# Patient Record
Sex: FEMALE | Race: WHITE | NOT HISPANIC OR LATINO | Employment: OTHER | URBAN - NONMETROPOLITAN AREA
[De-identification: names, ages, dates, MRNs, and addresses within clinical notes are randomized per-mention and may not be internally consistent; named-entity substitution may affect disease eponyms.]

---

## 2019-09-23 ENCOUNTER — OFFICE VISIT CONVERTED (OUTPATIENT)
Dept: FAMILY MEDICINE CLINIC | Age: 74
End: 2019-09-23
Attending: NURSE PRACTITIONER

## 2020-06-02 ENCOUNTER — OFFICE VISIT CONVERTED (OUTPATIENT)
Dept: FAMILY MEDICINE CLINIC | Age: 75
End: 2020-06-02
Attending: NURSE PRACTITIONER

## 2020-06-10 ENCOUNTER — OFFICE VISIT CONVERTED (OUTPATIENT)
Dept: FAMILY MEDICINE CLINIC | Age: 75
End: 2020-06-10
Attending: NURSE PRACTITIONER

## 2020-06-10 ENCOUNTER — HOSPITAL ENCOUNTER (OUTPATIENT)
Dept: OTHER | Facility: HOSPITAL | Age: 75
Discharge: HOME | End: 2020-06-10
Attending: NURSE PRACTITIONER

## 2020-06-10 LAB
ALBUMIN SERPL-MCNC: 4.2 G/DL (ref 3.5–5)
ALBUMIN/GLOB SERPL: 1.7 {RATIO} (ref 1.4–2.6)
ALP SERPL-CCNC: 63 U/L (ref 43–160)
ALT SERPL-CCNC: 8 U/L (ref 10–40)
ANION GAP SERPL CALC-SCNC: 13 MMOL/L (ref 8–19)
AST SERPL-CCNC: 16 U/L (ref 15–50)
BILIRUB SERPL-MCNC: 0.82 MG/DL (ref 0.2–1.3)
BUN SERPL-MCNC: 15 MG/DL (ref 5–25)
BUN/CREAT SERPL: 16 {RATIO} (ref 6–20)
CALCIUM SERPL-MCNC: 9.5 MG/DL (ref 8.7–10.4)
CHLORIDE SERPL-SCNC: 104 MMOL/L (ref 99–111)
CHOLEST SERPL-MCNC: 192 MG/DL (ref 107–200)
CHOLEST/HDLC SERPL: 2 {RATIO} (ref 3–6)
CONV CO2: 26 MMOL/L (ref 22–32)
CONV TOTAL PROTEIN: 6.7 G/DL (ref 6.3–8.2)
CREAT UR-MCNC: 0.91 MG/DL (ref 0.5–0.9)
GFR SERPLBLD BASED ON 1.73 SQ M-ARVRAT: >60 ML/MIN/{1.73_M2}
GLOBULIN UR ELPH-MCNC: 2.5 G/DL (ref 2–3.5)
GLUCOSE SERPL-MCNC: 98 MG/DL (ref 65–99)
HDLC SERPL-MCNC: 97 MG/DL (ref 40–60)
LDLC SERPL CALC-MCNC: 84 MG/DL (ref 70–100)
OSMOLALITY SERPL CALC.SUM OF ELEC: 289 MOSM/KG (ref 273–304)
POTASSIUM SERPL-SCNC: 4.1 MMOL/L (ref 3.5–5.3)
SODIUM SERPL-SCNC: 139 MMOL/L (ref 135–147)
TRIGL SERPL-MCNC: 56 MG/DL (ref 40–150)
TSH SERPL-ACNC: 2.54 M[IU]/L (ref 0.27–4.2)
VLDLC SERPL-MCNC: 11 MG/DL (ref 5–37)

## 2020-08-04 ENCOUNTER — HOSPITAL ENCOUNTER (OUTPATIENT)
Dept: OTHER | Facility: HOSPITAL | Age: 75
Discharge: HOME | End: 2020-08-04
Attending: NURSE PRACTITIONER

## 2021-03-16 ENCOUNTER — HOSPITAL ENCOUNTER (OUTPATIENT)
Dept: VACCINE CLINIC | Facility: HOSPITAL | Age: 76
Discharge: HOME | End: 2021-03-16
Attending: INTERNAL MEDICINE

## 2021-04-06 ENCOUNTER — HOSPITAL ENCOUNTER (OUTPATIENT)
Dept: VACCINE CLINIC | Facility: HOSPITAL | Age: 76
Discharge: HOME | End: 2021-04-06
Attending: INTERNAL MEDICINE

## 2021-05-18 NOTE — PROGRESS NOTES
Mau Lo 1945     Office/Outpatient Visit    Visit Date: Mon, Sep 23, 2019 01:39 pm    Provider: Ko Alcantar N.P. (Assistant: Jessica Salinas MA)    Location: [facility]        Electronically signed by Ko Alcantar N.P. on  09/23/2019 07:54:07 PM                             SUBJECTIVE:        CC:     Ms. Lo is a 74 year old White female.  Patient here today for L knee injection;         HPI:         Patient complains of knee pain.  The affected area is the left knee.  for knee pain.  Pain began 6 months ago.  The pattern of joint symptoms has been progressive worsening.  Pain is decreased with ice.  She denies associated symptoms including swelling and warmth.  Pertinent medical history is remarkable for osteoarthritis.      ROS:     CONSTITUTIONAL:  Negative for chills, fatigue, fever, and weight change.      CARDIOVASCULAR:  Negative for chest pain, orthopnea, paroxysmal nocturnal dyspnea and pedal edema.      RESPIRATORY:  Negative for dyspnea.      GASTROINTESTINAL:  Negative for abdominal pain, constipation, diarrhea, nausea and vomiting.      MUSCULOSKELETAL:  Positive for Hx Bilateral knee pain, gotten worse over last 6mo.      PSYCHIATRIC:  Negative for anxiety, depression, and sleep disturbances.          PMH/FMH/SH:     Last Reviewed on 9/23/2019 02:05 PM by Ko Alcantar    Past Medical History: moved back from [place] june 2017 former pt dr royal lozada [phone]     UNREMARKABLE         PREVENTIVE HEALTH MAINTENANCE             BONE DENSITY: was last done june 2017 with the following abnormality noted-- borderline osteopenia see old records     COLORECTAL CANCER SCREENING: Up to date (colonoscopy q10y; sigmoidoscopy q5y; Cologuard q3y) was last done 2007; colonoscopy with normal results     EYE EXAM: was last done jan 2016     MAMMOGRAM: Done within last 2 years and results in are chart was last done june 2017 with normal results     PAP  SMEAR: was last done 2016 with normal results         Surgical History:         Hysterectomy    Tonsillectomy/Adenoidectomy    right shoulder surgery 2016  second surgery;      Laminectomy         Family History:     Father: ;  Cerebrovascular Accident     Mother: stroke  2016;  Osteoporosis         Social History:     Occupation:    Retired     Marital Status:      Children: 3 children         Tobacco/Alcohol/Supplements:     Last Reviewed on 2019 02:05 PM by Ko Alcantar    Tobacco:  Nonsmoker (never smoked);         Alcohol:    Drinks alcohol very infrequently.          Substance Abuse History:     Last Reviewed on 2019 02:05 PM by Ko Alcantar        Mental Health History:     Last Reviewed on 2019 02:05 PM by Ko Alcantar        Communicable Diseases (eg STDs):     Last Reviewed on 2019 02:05 PM by Ko Alcantar            Current Problems:     Last Reviewed on 2019 02:05 PM by Ko Aclantar    Mixed hyperlipidemia     Low back pain     Knee pain         Immunizations:     Prevnar 13 (Pneumococcal PCV 13) 10/12/2016     Flulaval 10/19/2010     Fluzone (3 + years dose) 2009     Influenza A (H1N1), IM (3+ years) Monovalent 2009     Influenza High-Dose Virus Vaccine pf (>=65 yr) 10/11/2017     Influenza High-Dose Virus Vaccine pf (>=65 yr) 10/5/2016     PNEUMOVAX 23 (Pneumococcal PPV23) 2017     Tdap (Tetanus, reduced diph, acellular pertussis) 2011         Allergies:     Last Reviewed on 2019 02:05 PM by Ko Alcantar    Pneumovax 23: redness and swelling at injection site (Adverse Reaction)    Penicillins:    Macrodantin:    Ultram:        Current Medications:     Last Reviewed on 2019 02:05 PM by Ko Alcantar    Estradiol 0.075mg Transdermal Patch Apply 1 patch(es) to lower abdomen 2 times weekly     Multivitamin/Mineral Supplement         OBJECTIVE:        Vitals:         Current: 2019  1:45:48 PM    Ht:  5 ft, 3.75 in;  Wt: 143.2 lbs;  BMI: 24.8    T: 97.8 F (oral);  BP: 132/64 mm Hg (left arm, sitting);  P: 84 bpm (left arm (BP Cuff), sitting);  sCr: 0.8 mg/dL;  GFR: 62.07        Exams:     PHYSICAL EXAM:     GENERAL: vital signs recorded - well developed, well nourished;  no apparent distress;     RESPIRATORY: normal respiratory rate and pattern with no distress; normal breath sounds with no rales, rhonchi, wheezes or rubs;     CARDIOVASCULAR: normal rate; rhythm is regular;  no systolic murmur; no edema;     GASTROINTESTINAL: nontender; normal bowel sounds; no organomegaly;     MUSCULOSKELETAL: Left knee generalized tenderness with FROM     NEUROLOGIC: GROSSLY INTACT     PSYCHIATRIC:  appropriate affect and demeanor; normal speech pattern; grossly normal memory;         Procedures:     Knee pain     Procedure Note:  Arthrocentesis/Injection     Arthrocentesis of left knee joint is performed.  Written informed consent was obtained.  The site is prepped with alcohol and betadine.  The site is anesthetized with 1 cc of 1% lidocaine.  The joint is injected with Kenalog 40 mg.  The needle is carefully introduced into the joint space. The patient experienced minimal discomfort during the procedure.  No complications.              ASSESSMENT:           719.46   M70.42  Knee pain              DDx:         ORDERS:         Procedures Ordered:       20610  Arthrocentesis, major joint  (In-House)           Other Orders:         Kenalog, per 10 mg (x4)                 PLAN:          Knee pain           Orders:       [id number]  Arthrocentesis, major joint  (In-House)                     Kenalog, per 10 mg (x4)             CHARGE CAPTURE:           Primary Diagnosis:     719.46 Knee pain            M70.42    Prepatellar bursitis, left knee              Orders:          [id number]   Arthrocentesis, major joint  (In-House)                                           Kenalog, per 10 mg (x4)

## 2021-05-18 NOTE — PROGRESS NOTES
Mau Lo  1945     Office/Outpatient Visit    Visit Date: Tue, Jun 2, 2020 09:57 am    Provider: Ko Alcantar N.P. (Assistant: Mee Jimenez, )    Location: [facility]        Electronically signed by Ko Alcantar N.P. on  06/07/2020 09:00:39 PM                             Subjective:        CC: Ms. Lo is a 74 year old White female.  This is a follow-up visit.  knee injection;         HPI:       Here with right knee pain , gradually getting worse . Denies any recent fall or injury;    ROS:     CONSTITUTIONAL:  Negative for chills, fatigue, fever, and weight change.      CARDIOVASCULAR:  Negative for chest pain, orthopnea, paroxysmal nocturnal dyspnea and pedal edema.      RESPIRATORY:  Negative for dyspnea.      MUSCULOSKELETAL:  Positive for Right knee pain , gradually getting worse.      NEUROLOGICAL:  Negative for dizziness, headaches, paresthesias, and weakness.      PSYCHIATRIC:  Negative for anxiety, depression, and sleep disturbances.          Past Medical History / Family History / Social History:         Last Reviewed on 6/02/2020 10:34 AM by Ko Alcantar    Past Medical History: moved back from [place] june 2017 former pt dr royal lozada [phone]     UNREMARKABLE         PREVENTIVE HEALTH MAINTENANCE             BONE DENSITY: was last done june 2017 with the following abnormality noted-- borderline osteopenia see old records     COLORECTAL CANCER SCREENING: Up to date (colonoscopy q10y; sigmoidoscopy q5y; Cologuard q3y) was last done 2015; colonoscopy with normal results; Cologaurd neg 2015; Cologuard normal     EYE EXAM: was last done jan 2016     MAMMOGRAM: Done within last 2 years and results in are chart was last done 6/2019 with normal results     PAP SMEAR: was last done 6/2019 with normal results         Surgical History:         Hysterectomy    Tonsillectomy/Adenoidectomy    right shoulder surgery 2016  second surgery;      Laminectomy         Family History:     Father: ;  Cerebrovascular Accident     Mother: stroke  ;  Osteoporosis         Social History:     Occupation:    Retired     Marital Status:      Children: 3 children         Tobacco/Alcohol/Supplements:     Last Reviewed on 2020 10:34 AM by Ko Alcantar    Tobacco:  Nonsmoker (never smoked);         Alcohol:    Drinks alcohol very infrequently.          Current Problems:     Last Reviewed on 2020 10:34 AM by Ko Alcantar    Prepatellar bursitis, left knee    Encounter for screening for malignant neoplasm of colon        Immunizations:     Prevnar 13 (Pneumococcal PCV 13) 10/12/2016    Flulaval 10/19/2010    Fluzone (3 + years dose) 2009    Influenza A (H1N1), IM (3+ years) Monovalent 2009    Fluzone High-Dose pf (>=65 yr) 2019    Influenza High-Dose Virus Vaccine pf (>=65 yr) 10/11/2017    Influenza High-Dose Virus Vaccine pf (>=65 yr) 10/5/2016    PNEUMOVAX 23 (Pneumococcal PPV23) 2017    Tdap (Tetanus, reduced diph, acellular pertussis) 2011        Allergies:     Last Reviewed on 2020 10:34 AM by Ko Alcantar    Pneumovax 23: redness and swelling at injection site  (Adverse Reaction)    Penicillins:      Macrodantin:      Ultram:          Current Medications:     Last Reviewed on 2020 10:34 AM by Ko Alcantar    Multivitamin/Mineral Supplement     Estradiol 0.075 mg/24 hr Transdermal Patch, Transdermal Weekly [Apply 1 patch(es) to lower abdomen 2 times weekly]    Vitamin D3     B12     biotin         Objective:        Vitals:         Current: 2020 10:03:52 AM    Ht:  5 ft, 3.75 in;  Wt: 141.8 lbs;  BMI: 24.5T: 98.1 F (oral);  BP: 112/64 mm Hg (right arm, sitting);  P: 73 bpm (right arm (BP Cuff), sitting);  sCr: 0.8 mg/dL;  GFR: 61.81        Exams:     PHYSICAL EXAM:     GENERAL: vital signs recorded - well developed, well nourished;  no apparent distress;     RESPIRATORY:  normal respiratory rate and pattern with no distress; normal breath sounds with no rales, rhonchi, wheezes or rubs;     CARDIOVASCULAR: normal rate; rhythm is regular;  no systolic murmur; no edema;     MUSCULOSKELETAL: Generalized right knee tenderness with FROM ,no edema , neg drawer test     NEUROLOGIC: GROSSLY INTACT     PSYCHIATRIC:  appropriate affect and demeanor; normal speech pattern; grossly normal memory;         Procedures:     Prepatellar bursitis, right knee    Procedure Note:  Arthrocentesis/Injection     Written informed consent was obtained.  The site is prepped with alcohol and betadine.  The site is anesthetized with 1 cc of 1% lidocaine.  The joint is injected with Kenalog 80mg mg.  The needle is carefully introduced into the joint space. The patient experienced no discomfort during the procedure.  No complications.              Assessment:         Z12.11   Encounter for screening for malignant neoplasm of colon       M70.41   Prepatellar bursitis, right knee           ORDERS:         Lab Orders:       APPTO  Appointment need  (In-House)              Procedures Ordered:       20610  Arthrocentesis aspir&/inj major jt/bursa w/o us  (In-House)              Other Orders:       [id number]  Cologuard  (Send-Out)            *  Kenalog 10 mg  (In-House)  (x8)                    Plan:         Encounter for screening for malignant neoplasm of colon        FOLLOW-UP: Schedule a follow-up appointment in 1 week.:.:for Medicare Wellness Visit           Orders:       [id number]  Cologuard  (Send-Out)            APPTO  Appointment need  (In-House)              Prepatellar bursitis, right kneeIce on and off 20 min cycles for tonight . dmt          Orders:       20610  Arthrocentesis aspir&/inj major jt/bursa w/o us  (In-House)            *  Kenalog 10 mg  (In-House)  (x8)                Patient Recommendations:        For  Encounter for screening for malignant neoplasm of colon:    Schedule a follow-up visit in  1 week.                APPOINTMENT INFORMATION:        Monday Tuesday Wednesday Thursday Friday Saturday Sunday            Time:___________________AM  PM   Date:_____________________             Charge Capture:         Primary Diagnosis:     Z12.11  Encounter for screening for malignant neoplasm of colon           Orders:      APPTO  Appointment need  (In-House)              M70.41  Prepatellar bursitis, right knee           Orders:      20610  Arthrocentesis aspir&/inj major jt/bursa w/o us  (In-House)            *  Kenalog 10 mg  (In-House)  (x8)

## 2021-05-18 NOTE — PROGRESS NOTES
Mau Lo  1945     Office/Outpatient Visit    Visit Date: Wed, Arnold 10, 2020 10:22 am    Provider: Ko Alcantar N.P. (Assistant: Janeth Reinoso RN)    Location: [facility]        Electronically signed by Ko Alcantar N.P. on  06/10/2020 01:15:12 PM                             Subjective:        CC: Ms. Lo is a 74 year old White female.  MCW Exam;         HPI:           Ms. Lo is here for a Medicare wellness visit.  The required HRA questions are integrated within this visit note. Family medical history and individual medical/surgical history were reviewed and updated.  A current height, weight, BMI, blood pressure, and pulse were recorded in the vitals section of the note and have been reviewed. Patient's medications, including supplements, were recorded in the chart and reviewed.  Current providers and suppliers were reviewed and updated.          Self-Assessment of Health: She rates her health as very good. She rates her confidence of being able to control/manage most of her health problems as very confident. Her physical/emotional health has limited her social activites not at all.  A review of possible cognitive impairment was performed and the following was noted: she is having difficulty driving;  not experiencing changes in memory A review of functional ability, including bathing, dressing, walking, and urine/bowel continence as well as level of safety was performed and was found to be negative.  Falls Risk: Has not had any falls or only one fall without injury in the past year.  She denies having trouble hearing the TV/radio when others do not, having to strain to hear or understand conversations and wearing hearing aid(s).  Concerning home safety, she reports that at home she DOES have adequate lighting, a skid resistant shower/tub, functioning smoke alarms and absence of throw rugs, but not grab bars in the bath or handrails on stairs.          Immunization  Status: ** >10 years since last Td booster; Age>60, no shingles vaccination; Physical Activity: She exercises for at least 20 minutes 3 or more days/week.; Type of diet patient normally eats is described as well-balanced with fruits and vegetables Tobacco: She has never smoked.  Preventative Health updated today           PHQ-9 Depression Screening: Completed form scanned and in chart; Total Score 0     ROS:     CONSTITUTIONAL:  Negative for fatigue and fever.      EYES:  Negative for blurred vision and eye pain.      E/N/T:  Negative for diminished hearing and nasal congestion.      CARDIOVASCULAR:  Negative for chest pain, palpitations, tachycardia, orthopnea, and edema.      RESPIRATORY:  Negative for recent cough, dyspnea and frequent wheezing.      GASTROINTESTINAL:  Negative for abdominal pain, constipation, diarrhea, nausea and vomiting.      GENITOURINARY:  Negative for dysuria and hematuria.      MUSCULOSKELETAL:  Negative for arthralgias and myalgias.      INTEGUMENTARY/BREAST:  Negative for atypical mole(s) and rash.      NEUROLOGICAL:  Negative for dizziness, memory loss, paresthesias, tremor and weakness.      PSYCHIATRIC:  Negative for anxiety, depression, and sleep disturbances.          Past Medical History / Family History / Social History:         Last Reviewed on 6/10/2020 10:46 AM by Ko Alcantar    Past Medical History: moved back from [place] june 2017 former pt dr royal lozada [phone]     UNREMARKABLE         PREVENTIVE HEALTH MAINTENANCE             BONE DENSITY: was last done june 2017 with the following abnormality noted-- borderline osteopenia see old records     COLORECTAL CANCER SCREENING: Up to date (colonoscopy q10y; sigmoidoscopy q5y; Cologuard q3y) was last done 2015-cologuard; colonoscopy with normal results; Cologaurd neg 2015; Cologuard normal     EYE EXAM: was last done jan 2016     MAMMOGRAM: Done within last 2 years and results in are chart was last done  2019 with normal results     PAP SMEAR: was last done 2019 with normal results         PAST MEDICAL HISTORY             CURRENT MEDICAL PROVIDERS:    Ophthalmologist: Dr Larsen         Surgical History:         Hysterectomy    Tonsillectomy/Adenoidectomy    right shoulder surgery 2016  second surgery;     Laminectomy         Family History:     Father: ;  Cerebrovascular Accident     Mother: stroke  2016;  Osteoporosis         Social History:     Occupation:    Retired     Marital Status:      Children: 3 children         Tobacco/Alcohol/Supplements:     Last Reviewed on 6/10/2020 10:46 AM by Ko Alcantar    Tobacco:  Nonsmoker (never smoked);         Alcohol:    Drinks alcohol very infrequently.          Mental Health History:     Last Reviewed on 6/10/2020 10:46 AM by Ko Alcantar        Current Problems:     Last Reviewed on 6/10/2020 10:46 AM by Ko Alcantar    Prepatellar bursitis, right knee    Encounter for screening for malignant neoplasm of colon    Encounter for screening for depression    Encounter for general adult medical examination without abnormal findings        Immunizations:     Prevnar 13 (Pneumococcal PCV 13) 10/12/2016    Flulaval 10/19/2010    Fluzone (3 + years dose) 2009    Influenza A (H1N1), IM (3+ years) Monovalent 2009    Fluzone High-Dose pf (>=65 yr) 2019    Influenza High-Dose Virus Vaccine pf (>=65 yr) 10/11/2017    Influenza High-Dose Virus Vaccine pf (>=65 yr) 10/5/2016    PNEUMOVAX 23 (Pneumococcal PPV23) 2017    Tdap (Tetanus, reduced diph, acellular pertussis) 2011        Allergies:     Last Reviewed on 6/10/2020 10:46 AM by Ko Alcantar    Pneumovax 23: redness and swelling at injection site  (Adverse Reaction)    Penicillins:      Macrodantin:      Ultram:          Current Medications:     Last Reviewed on 6/10/2020 10:46 AM by Ko Alcantar    Vitamin D3     B12     biotin      Multivitamin/Mineral Supplement     Estradiol 0.075 mg/24 hr Transdermal Patch, Transdermal Weekly [Apply 1 patch(es) to lower abdomen 2 times weekly]        Objective:        Vitals:         Current: 6/10/2020 10:35:13 AM    Ht:  5 ft, 3.75 in;  Wt: 140.2 lbs;  BMI: 24.3T: 96.7 F (temporal);  BP: 129/69 mm Hg (right arm, sitting);  P: 65 bpm (right arm (BP Cuff), sitting);  sCr: 0.8 mg/dL;  GFR: 61.51VA: 20/50 OD, 20/50 OS (near, without correction)        Exams:     PHYSICAL EXAM:     GENERAL: vital signs recorded - well developed, well nourished;  well groomed;  no apparent distress;     NECK:  supple, full ROM; no thyromegaly; no carotid bruits;     RESPIRATORY: normal respiratory rate and pattern with no distress; normal breath sounds with no rales, rhonchi, wheezes or rubs;     CARDIOVASCULAR: normal rate; rhythm is regular;  no systolic murmur; no edema;     GASTROINTESTINAL: nontender, nondistended; no hepatosplenomegaly or masses; no bruits;     MUSCULOSKELETAL:  Normal range of motion, strength and tone;     NEUROLOGIC: GROSSLY INTACT     PSYCHIATRIC:  appropriate affect and demeanor; normal speech pattern; grossly normal memory;         Assessment:         Z00.00   Encounter for general adult medical examination without abnormal findings       Z13.31   Encounter for screening for depression       Z12.11   Encounter for screening for malignant neoplasm of colon       Z12.31   Encounter for screening mammogram for malignant neoplasm of breast       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 Metabolic Panel  (Send-Out)            87567  LPDP - [facility] Lipid Panel  (Send-Out)            22960  TSH - [facility] TSH  (Send-Out)              Procedures Ordered:         Annual wellness visit, includes a PPPS, subsequent visit  (In-House)              Other Orders:       [id number]  Cologuard  (Send-Out)            1101F  Pt screen for fall risk; document no falls in past year or only 1 fall w/o injury in past year (JOHNNY)  (In-House)              Screening mammogram results documented  (Send-Out)              Depression screen negative  (In-House)                      Plan:         Encounter for general adult medical examination without abnormal findings    MIPS Has had no falls or only one fall without injury in the past year Vaccines Flu and Pneumonia updated in Shot record Screening mammomgram done within last 2 years and results in are chart Colorectal Cancer Screening is up to date and the results are in the chart ADVANCE DIRECTIVES (Please update in PMH): Living will at [facility]           Orders:       1101F  Pt screen for fall risk; document no falls in past year or only 1 fall w/o injury in past year (JOHNNY)  (In-House)              Screening mammogram results documented  (Send-Out)            3017F  Colorectal CA screen results documented and reviewed (PV)  (In-House)              Annual wellness visit, includes a PPPS, subsequent visit  (In-House)              Encounter for screening for depression    MIPS PHQ-9 Depression Screening: Completed form scanned and in chart; Total Score 0; Negative Depression Screen           Orders:         Depression screen negative  (In-House)              Encounter for screening for malignant neoplasm of colon          Orders:       92208  Cologuard  (Send-Out)              Encounter for screening mammogram for malignant neoplasm of breast        FOLLOW-UP TESTING #1:    RADIOLOGY:  I have ordered Screening 3D Mammogram  Bilateral to be done today.            Orders:       01976  Screening digital breast tomosynthesis bi  (Send-Out)              Other disorders of continuity of bone, unspecified site        FOLLOW-UP TESTING #1:    RADIOLOGY:  I have ordered Dexa Scan to be done today.            Orders:       62164  DXA, bone density study, 1 or more sites; axial skeleton (eg hips, pelvis, spine)  (Send-Out)              Encounter for screening for cardiovascular disorders    LABORATORY:  Labs ordered to be performed today include Comprehensive metabolic panel, lipid panel, and TSH.            Orders:       39991  COMP - [facility] Comp. Metabolic Panel  (Send-Out)            79528  LPDP - [facility] Lipid Panel  (Send-Out)            71802  TSH - [facility] TSH  (Send-Out)                  Patient Recommendations:        For  Encounter for general adult medical examination without abnormal findings:    I also recommend at [facility].              Charge Capture:         Primary Diagnosis:     Z00.00  Encounter for general adult medical examination without abnormal findings           Orders:      56232  Preventive medicine, established patient, age 65+ years  (In-House)            1101F  Pt screen for fall risk; document no falls in past year or only 1 fall w/o injury in past year (JOHNNY)  (In-House)            3017F  Colorectal CA screen results documented and reviewed (PV)  (In-House)              Annual wellness visit, includes a PPPS, subsequent visit  (In-House)              Z13.31  Encounter for screening for depression           Orders:        Depression screen negative  (In-House)              Z12.11  Encounter for screening for malignant neoplasm of colon     Z12.31  Encounter for screening mammogram for malignant neoplasm of breast     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

## 2021-07-01 VITALS
TEMPERATURE: 97.8 F | SYSTOLIC BLOOD PRESSURE: 132 MMHG | BODY MASS INDEX: 24.45 KG/M2 | HEART RATE: 84 BPM | DIASTOLIC BLOOD PRESSURE: 64 MMHG | HEIGHT: 64 IN | WEIGHT: 143.2 LBS

## 2021-07-02 VITALS
SYSTOLIC BLOOD PRESSURE: 129 MMHG | TEMPERATURE: 96.7 F | DIASTOLIC BLOOD PRESSURE: 69 MMHG | WEIGHT: 140.2 LBS | BODY MASS INDEX: 23.93 KG/M2 | HEART RATE: 65 BPM | HEIGHT: 64 IN

## 2021-07-02 VITALS
TEMPERATURE: 98.1 F | HEART RATE: 73 BPM | HEIGHT: 64 IN | SYSTOLIC BLOOD PRESSURE: 112 MMHG | BODY MASS INDEX: 24.21 KG/M2 | DIASTOLIC BLOOD PRESSURE: 64 MMHG | WEIGHT: 141.8 LBS

## 2021-07-06 ENCOUNTER — TRANSCRIBE ORDERS (OUTPATIENT)
Dept: ADMINISTRATIVE | Facility: HOSPITAL | Age: 76
End: 2021-07-06

## 2021-07-06 DIAGNOSIS — Z12.31 VISIT FOR SCREENING MAMMOGRAM: Primary | ICD-10-CM

## 2021-07-13 ENCOUNTER — TELEPHONE (OUTPATIENT)
Dept: FAMILY MEDICINE CLINIC | Age: 76
End: 2021-07-13

## 2021-07-13 ENCOUNTER — LAB (OUTPATIENT)
Dept: LAB | Facility: HOSPITAL | Age: 76
End: 2021-07-13

## 2021-07-13 DIAGNOSIS — R30.0 DYSURIA: ICD-10-CM

## 2021-07-13 DIAGNOSIS — R30.0 DYSURIA: Primary | ICD-10-CM

## 2021-07-13 LAB
BACTERIA UR QL AUTO: ABNORMAL /HPF
BILIRUB UR QL STRIP: NEGATIVE
CLARITY UR: ABNORMAL
COLOR UR: YELLOW
GLUCOSE UR STRIP-MCNC: NEGATIVE MG/DL
HGB UR QL STRIP.AUTO: ABNORMAL
KETONES UR QL STRIP: NEGATIVE
LEUKOCYTE ESTERASE UR QL STRIP.AUTO: NEGATIVE
NITRITE UR QL STRIP: POSITIVE
PH UR STRIP.AUTO: 6.5 [PH] (ref 5–8)
PROT UR QL STRIP: NEGATIVE
RBC # UR: ABNORMAL /HPF
REF LAB TEST METHOD: ABNORMAL
SP GR UR STRIP: 1.01 (ref 1–1.03)
SQUAMOUS #/AREA URNS HPF: ABNORMAL /HPF
UROBILINOGEN UR QL STRIP: ABNORMAL
WBC UR QL AUTO: ABNORMAL /HPF

## 2021-07-13 PROCEDURE — 87086 URINE CULTURE/COLONY COUNT: CPT

## 2021-07-13 PROCEDURE — 87077 CULTURE AEROBIC IDENTIFY: CPT

## 2021-07-13 PROCEDURE — 81001 URINALYSIS AUTO W/SCOPE: CPT

## 2021-07-13 PROCEDURE — 87186 SC STD MICRODIL/AGAR DIL: CPT

## 2021-07-13 RX ORDER — SULFAMETHOXAZOLE AND TRIMETHOPRIM 800; 160 MG/1; MG/1
1 TABLET ORAL 2 TIMES DAILY
Qty: 6 TABLET | Refills: 0 | Status: SHIPPED | OUTPATIENT
Start: 2021-07-13 | End: 2021-07-16

## 2021-07-13 NOTE — TELEPHONE ENCOUNTER
Pts  coming in for appt, wife would like to have a urine done if possible, she thinks she has a uti, please adv, lab pending.

## 2021-07-15 LAB — BACTERIA SPEC AEROBE CULT: ABNORMAL

## 2021-08-05 ENCOUNTER — APPOINTMENT (OUTPATIENT)
Dept: MAMMOGRAPHY | Facility: HOSPITAL | Age: 76
End: 2021-08-05

## 2021-08-11 ENCOUNTER — TELEPHONE (OUTPATIENT)
Dept: FAMILY MEDICINE CLINIC | Age: 76
End: 2021-08-11

## 2021-08-12 ENCOUNTER — HOSPITAL ENCOUNTER (OUTPATIENT)
Dept: MAMMOGRAPHY | Facility: HOSPITAL | Age: 76
Discharge: HOME | End: 2021-08-12
Admitting: OBSTETRICS & GYNECOLOGY

## 2021-08-12 DIAGNOSIS — Z12.31 VISIT FOR SCREENING MAMMOGRAM: ICD-10-CM

## 2021-08-12 PROCEDURE — 77063 BREAST TOMOSYNTHESIS BI: CPT

## 2021-08-12 PROCEDURE — 77067 SCR MAMMO BI INCL CAD: CPT

## 2023-11-06 ENCOUNTER — TELEPHONE (OUTPATIENT)
Dept: ORTHOPEDIC SURGERY | Facility: CLINIC | Age: 78
End: 2023-11-06
Payer: MEDICARE

## 2023-11-06 NOTE — TELEPHONE ENCOUNTER
LEFT VOICEMAIL FOR PATIENT THAT WE HAVE RECEIVED A REFERRAL FROM HER PCP FOR HER SHOULDER.  PATIENT HAS [employer] MEDICARE REPLACEMENT AND WILL NEED TO SEE IF SHE HAS OUT OF NETWORK BENEFITS BEFORE WE CAN SCHEDULE HER.      IF PATIENT HAS OUT OF NETWORK BENEFITS AND WOULD STILL LIKE TO BE SEEN, IT IS OK TO SCHEDULE HER WITH ESTEBAN KOENIG PA-C AS LONG AS SHE HAS NOT HAD A TOTAL JOINT REPLACEMENT IN THE SHOULDER NEEDING TO BE SEEN FOR.    OK FOR HUB TO READ AND SCHEDULE

## 2024-06-25 ENCOUNTER — TELEPHONE (OUTPATIENT)
Dept: FAMILY MEDICINE CLINIC | Age: 79
End: 2024-06-25
Payer: MEDICARE

## 2024-06-25 NOTE — TELEPHONE ENCOUNTER
Caller: Mau Lo    Relationship: Self    Best call back number: [phone]     What is the best time to reach you: ANYTIME     Who are you requesting to speak with (clinical staff, provider,  specific staff member): CLINICAL     What was the call regarding: PATIENT IS CALLING REQUESTING TO SPEAK WITH PCP, ABOUT A COMPOUND MEDICATION AND POSSIBLE NEW PATIENT APPOINTMENT FOR SPOUSE.

## 2024-07-05 ENCOUNTER — OFFICE VISIT (OUTPATIENT)
Dept: FAMILY MEDICINE CLINIC | Age: 79
End: 2024-07-05
Payer: MEDICARE

## 2024-07-05 VITALS
HEART RATE: 63 BPM | SYSTOLIC BLOOD PRESSURE: 147 MMHG | TEMPERATURE: 97.8 F | DIASTOLIC BLOOD PRESSURE: 75 MMHG | WEIGHT: 139.2 LBS | HEIGHT: 64 IN | BODY MASS INDEX: 23.76 KG/M2

## 2024-07-05 DIAGNOSIS — M81.0 AGE-RELATED OSTEOPOROSIS WITHOUT CURRENT PATHOLOGICAL FRACTURE: ICD-10-CM

## 2024-07-05 DIAGNOSIS — Z78.0 POSTMENOPAUSAL STATE: ICD-10-CM

## 2024-07-05 DIAGNOSIS — M15.9 GENERALIZED OA: Chronic | ICD-10-CM

## 2024-07-05 DIAGNOSIS — Z13.820 ENCOUNTER FOR SCREENING FOR OSTEOPOROSIS: Primary | ICD-10-CM

## 2024-07-05 PROBLEM — M75.101 ROTATOR CUFF TEAR ARTHROPATHY OF RIGHT SHOULDER: Status: ACTIVE | Noted: 2022-12-29

## 2024-07-05 PROBLEM — M12.811 ROTATOR CUFF TEAR ARTHROPATHY OF RIGHT SHOULDER: Status: ACTIVE | Noted: 2022-12-29

## 2024-07-05 RX ORDER — ESTRADIOL 0.06 MG/D
1 PATCH TRANSDERMAL WEEKLY
Qty: 12 PATCH | Refills: 2 | Status: SHIPPED | OUTPATIENT
Start: 2024-07-05

## 2024-07-05 RX ORDER — MELOXICAM 15 MG/1
15 TABLET ORAL DAILY PRN
Qty: 30 TABLET | Refills: 1 | Status: SHIPPED | OUTPATIENT
Start: 2024-07-05

## 2024-07-05 RX ORDER — TRIAMCINOLONE ACETONIDE 40 MG/ML
60 INJECTION, SUSPENSION INTRA-ARTICULAR; INTRAMUSCULAR
Status: COMPLETED | OUTPATIENT
Start: 2024-07-05 | End: 2024-07-05

## 2024-07-05 RX ORDER — NAFTIFINE HYDROCHLORIDE 2 G/100G
GEL TOPICAL
COMMUNITY
Start: 2024-03-12

## 2024-07-05 RX ADMIN — TRIAMCINOLONE ACETONIDE 60 MG: 40 INJECTION, SUSPENSION INTRA-ARTICULAR; INTRAMUSCULAR at 14:43

## 2024-07-05 NOTE — Clinical Note
Make sure when her last Medicare wellness was done, may have to check with her insurance, and make sure her Medicare wellness is scheduled 1 day after

## 2024-07-05 NOTE — PROGRESS NOTES
Chief Complaint  Establish Care    Subjective        Mau Lo presents to [facility] today to establish care with PCP.  Had seen provider previously at another location, and with [facility] several years ago wanted to reestablish care.    History includes osteoarthritis, worse right shoulder after having rotator cuff repair, menopausal, last bone density 8/4/2020, due to have repeat will order today.  Labs done 9/27/2023 at outside lab reviewed, lipids stable CMP and CBC stable.  Cologuard 7/9/2023 negative.  Mammogram - negative 10/10/2023.    Regarding right shoulder pain, after rotator cuff surgery, has chronic intermittent arthritis pain in her right shoulder, did receive a steroid injection around 4 to 5 months ago which did help with her pain, also uses topical ice and heat as needed for pain.  Takes meloxicam every 2 to 3 days for the pain, only when it severe.            Current Outpatient Medications:     estradiol (CLIMARA) 0.06 MG/24HR patch, Place 1 patch on the skin as directed by provider 1 (One) Time Per Week., Disp: 12 patch, Rfl: 2    meloxicam (MOBIC) 15 MG tablet, Take 1 tablet by mouth Daily after a meal As Needed for arthritis pain., Disp: 90 tablet, Rfl: 2    meloxicam (MOBIC) 15 MG tablet, Take 1 tablet by mouth Daily after a meal As Needed for arthritis pain., Disp: 30 tablet, Rfl: 1    Naftin 2 % gel, APPLY A THIN LAYER TO THE FOOT TWICE A DAY WHEN FLARED, Disp: , Rfl:   Medications Discontinued During This Encounter   Medication Reason    azithromycin (ZITHROMAX) 250 MG tablet *Therapy completed    doxycycline (VIBRAMYICN) 100 MG tablet *Therapy completed    triamcinolone (KENALOG) 0.025 % cream *Therapy completed    hydrocortisone 2.5 % cream *Therapy completed    clobetasol (TEMOVATE) 0.05 % cream *Therapy completed    meloxicam (MOBIC) 15 MG tablet Reorder    estradiol (CLIMARA) 0.06 MG/24HR patch Reorder         Allergies:  Nitrofurantoin,  "Penicillins, and Tramadol      Objective   Vital Signs:   Vitals:    07/05/24 1357   BP: 147/75   BP Location: Right arm   Patient Position: Sitting   Cuff Size: Adult   Pulse: 63   Temp: 97.8 °F (36.6 °C)   TempSrc: Oral   Weight: 63.1 kg (139 lb 3.2 oz)   Height: 161.9 cm (63.75\")     Body mass index is 24.08 kg/m².  BMI is within normal parameters. No other follow-up for BMI required.        Physical Exam  Constitutional:       Appearance: Normal appearance.   Neck:      Vascular: No carotid bruit.   Cardiovascular:      Rate and Rhythm: Normal rate and regular rhythm.      Heart sounds: Normal heart sounds.   Pulmonary:      Effort: Pulmonary effort is normal.      Breath sounds: Normal breath sounds.   Musculoskeletal:         General: Tenderness present.      Comments: Right posterior anterior shoulder tenderness, limited range of motion due to tenderness status post rotator cuff repair   Skin:     General: Skin is warm and dry.   Neurological:      General: No focal deficit present.      Mental Status: She is alert.   Psychiatric:         Mood and Affect: Mood normal.         Behavior: Behavior normal.             Lab Results   Component Value Date    GLUCOSE 98 06/10/2020    BUN 15 06/10/2020    CREATININE 0.91 (H) 06/10/2020    BCR 16 06/10/2020    K 4.1 06/10/2020    CO2 26 06/10/2020    CALCIUM 9.5 06/10/2020    ALBUMIN 4.2 06/10/2020    LABIL2 1.7 06/10/2020    AST 16 06/10/2020    ALT 8 (L) 06/10/2020       Lab Results   Component Value Date    CHLPL 192 06/10/2020    TRIG 56 06/10/2020    HDL 97 (H) 06/10/2020    LDL 84 06/10/2020               Arthrocentesis    Date/Time: 7/5/2024 2:43 PM    Performed by: Hermelinda Alcantar APRN  Authorized by: Hermelinda Alcantar APRN  Indications: pain   Body area: shoulder  Joint: right shoulder  Local anesthesia used: no    Anesthesia:  Local anesthesia used: no    Sedation:  Patient sedated: no    Needle size: 22 G  Ultrasound guidance: no  Approach: " posterior  Meds administered: 60 mg triamcinolone acetonide 40 MG/ML               Diagnoses and all orders for this visit:    1. Encounter for screening for osteoporosis (Primary)  -     DEXA Bone Density Axial; Future    2. Postmenopausal state  -     estradiol (CLIMARA) 0.06 MG/24HR patch; Place 1 patch on the skin as directed by provider 1 (One) Time Per Week.  Dispense: 12 patch; Refill: 2    3. Age-related osteoporosis without current pathological fracture  -     DEXA Bone Density Axial; Future    4. Generalized OA  -     meloxicam (MOBIC) 15 MG tablet; Take 1 tablet by mouth Daily after a meal As Needed for arthritis pain.  Dispense: 30 tablet; Refill: 1  -     Arthrocentesis            Follow Up  Return in about 2 months (around 9/5/2024) for will call with imaging results , next steps, Medicare Wellness.  Patient was given instructions and counseling regarding her condition or for health maintenance advice. Please see specific information pulled into the AVS if appropriate.           Ko Alcantar, APRN  07/05/2024    Please note that portions of this document were completed using a voice recognition program.

## 2024-07-05 NOTE — Clinical Note
Get records from Dr. Artis's office, to see when her last Medicare wellness was done and that we have a copy of her previous labs

## 2024-07-08 ENCOUNTER — TELEPHONE (OUTPATIENT)
Dept: FAMILY MEDICINE CLINIC | Age: 79
End: 2024-07-08
Payer: MEDICARE

## 2024-07-08 NOTE — TELEPHONE ENCOUNTER
----- Message from Hermelinda Alcantar sent at 7/5/2024  2:46 PM EDT -----  Get records from Dr. Artis's office, to see when her last Medicare wellness was done and that we have a copy of her previous labs

## 2024-08-28 ENCOUNTER — HOSPITAL ENCOUNTER (OUTPATIENT)
Dept: BONE DENSITY | Facility: HOSPITAL | Age: 79
Discharge: HOME | End: 2024-08-28
Admitting: NURSE PRACTITIONER
Payer: MEDICARE

## 2024-08-28 DIAGNOSIS — Z13.820 ENCOUNTER FOR SCREENING FOR OSTEOPOROSIS: ICD-10-CM

## 2024-08-28 DIAGNOSIS — M81.0 AGE-RELATED OSTEOPOROSIS WITHOUT CURRENT PATHOLOGICAL FRACTURE: ICD-10-CM

## 2024-08-28 PROCEDURE — 77080 DXA BONE DENSITY AXIAL: CPT

## 2024-08-28 NOTE — PROGRESS NOTES
Impression:  Osteopenia -Based upon the FRAX score, patient does not meet criteria for initiation of pharmacological treatment recommendations for prevention of osteoporosis per the National Osteoporosis Foundation (NOF) guidelines. However, individualized treatment   decisions should be made accounting for additional clinical factors.  Please let her know, she should be taken calcium with vitamin D twice a day and continue to do weightbearing exercises, repeat her bone density in 2 years

## 2024-09-05 ENCOUNTER — OFFICE VISIT (OUTPATIENT)
Dept: FAMILY MEDICINE CLINIC | Age: 79
End: 2024-09-05
Payer: MEDICARE

## 2024-09-05 VITALS
BODY MASS INDEX: 23.15 KG/M2 | WEIGHT: 135.6 LBS | SYSTOLIC BLOOD PRESSURE: 145 MMHG | DIASTOLIC BLOOD PRESSURE: 75 MMHG | HEART RATE: 67 BPM | HEIGHT: 64 IN

## 2024-09-05 DIAGNOSIS — Z11.59 NEED FOR HEPATITIS C SCREENING TEST: ICD-10-CM

## 2024-09-05 DIAGNOSIS — Z00.00 MEDICARE ANNUAL WELLNESS VISIT, SUBSEQUENT: Primary | ICD-10-CM

## 2024-09-05 NOTE — PROGRESS NOTES
Subjective   The ABCs of the Annual Wellness Visit  Medicare Wellness Visit      Mau Lo is a 79 y.o. patient who presents for a Medicare Wellness Visit.    The following portions of the patient's history were reviewed and   updated as appropriate: allergies, current medications, past family history, past medical history, past social history, past surgical history, and problem list.    Compared to one year ago, the patient's physical   health is the same.  Compared to one year ago, the patient's mental   health is the same.    Recent Hospitalizations:  She was not admitted to the hospital during the last year.     Current Medical Providers:  Patient Care Team:  Hermelinda Alcantar APRN as PCP - General (Nurse Practitioner)  Cleveland Larsen Jr., MD as Consulting Physician (Ophthalmology)  Amy Crespo MD (Obstetrics and Gynecology)    Outpatient Medications Prior to Visit   Medication Sig Dispense Refill    estradiol (CLIMARA) 0.06 MG/24HR patch Place 1 patch on the skin as directed by provider 1 (One) Time Per Week. 12 patch 2    meloxicam (MOBIC) 15 MG tablet Take 1 tablet by mouth Daily after a meal As Needed for arthritis pain. 30 tablet 1    Naftin 2 % gel APPLY A THIN LAYER TO THE FOOT TWICE A DAY WHEN FLARED      meloxicam (MOBIC) 15 MG tablet Take 1 tablet by mouth Daily after a meal As Needed for arthritis pain. 90 tablet 2     No facility-administered medications prior to visit.     No opioid medication identified on active medication list. I have reviewed chart for other potential  high risk medication/s and harmful drug interactions in the elderly.      Aspirin is not on active medication list.  Aspirin use is not indicated based on review of current medical condition/s. Risk of harm outweighs potential benefits.  .    Patient Active Problem List   Diagnosis    Rotator cuff tear arthropathy of right shoulder    Generalized OA     Advance Care Planning Advance Directive is not on  "file.  ACP discussion was held with the patient during this visit. Patient has an advance directive (not in EMR), copy requested.            Objective   Vitals:    24 1400   BP: 145/75   BP Location: Left arm   Patient Position: Sitting   Pulse: 67   Weight: 61.5 kg (135 lb 9.6 oz)   Height: 161.9 cm (63.75\")       Estimated body mass index is 23.46 kg/m² as calculated from the following:    Height as of this encounter: 161.9 cm (63.75\").    Weight as of this encounter: 61.5 kg (135 lb 9.6 oz).    BMI is within normal parameters. No other follow-up for BMI required.       Does the patient have evidence of cognitive impairment? No                                                                                               Health  Risk Assessment    Smoking Status:  Social History     Tobacco Use   Smoking Status Never    Passive exposure: Never   Smokeless Tobacco Never     Alcohol Consumption:  Social History     Substance and Sexual Activity   Alcohol Use Yes    Comment: occassionally       Fall Risk Screen  STEADI Fall Risk Assessment was completed, and patient is at LOW risk for falls.Assessment completed on:2024    Depression Screenin/5/2024     2:04 PM   PHQ-2/PHQ-9 Depression Screening   Little Interest or Pleasure in Doing Things 0-->not at all   Feeling Down, Depressed or Hopeless 0-->not at all   PHQ-9: Brief Depression Severity Measure Score 0     Health Habits and Functional and Cognitive Screenin/5/2024     2:00 PM   Functional & Cognitive Status   Do you have difficulty preparing food and eating? No   Do you have difficulty bathing yourself, getting dressed or grooming yourself? No   Do you have difficulty using the toilet? No   Do you have difficulty moving around from place to place? No   Do you have trouble with steps or getting out of a bed or a chair? No   Current Diet Well Balanced Diet   Dental Exam Not up to date   Eye Exam Up to date   Exercise (times per week) 7 " times per week   Current Exercises Include Walking   Do you need help using the phone?  No   Are you deaf or do you have serious difficulty hearing?  No   Do you need help to go to places out of walking distance? No   Do you need help shopping? No   Do you need help preparing meals?  No   Do you need help with housework?  No   Do you need help with laundry? No   Do you need help taking your medications? No   Do you need help managing money? No   Do you ever drive or ride in a car without wearing a seat belt? No   Have you felt unusual stress, anger or loneliness in the last month? No   Who do you live with? Spouse   If you need help, do you have trouble finding someone available to you? No   Have you been bothered in the last four weeks by sexual problems? No   Do you have difficulty concentrating, remembering or making decisions? No           Age-appropriate Screening Schedule:  Refer to the list below for future screening recommendations based on patient's age, sex and/or medical conditions. Orders for these recommended tests are listed in the plan section. The patient has been provided with a written plan.    Health Maintenance List  Health Maintenance   Topic Date Due    ZOSTER VACCINE (1 of 2) Never done    TDAP/TD VACCINES (2 - Td or Tdap) 01/09/2021    INFLUENZA VACCINE  08/01/2024    COVID-19 Vaccine (5 - 2023-24 season) 09/07/2024 (Originally 9/1/2024)    ANNUAL WELLNESS VISIT  09/05/2025    COLORECTAL CANCER SCREENING  07/09/2026    DXA SCAN  08/28/2026    HEPATITIS C SCREENING  Completed    RSV Vaccine - Adults  Completed    Pneumococcal Vaccine 65+  Completed                                                                                                                                                CMS Preventative Services Quick Reference  Risk Factors Identified During Encounter  Immunizations Discussed/Encouraged: Tdap, Shingrix, and COVID19    The above risks/problems have been discussed with the  patient.  Pertinent information has been shared with the patient in the After Visit Summary.  An After Visit Summary and PPPS were made available to the patient.    Follow Up:   Next Medicare Wellness visit to be scheduled in 1 year.     Assessment & Plan  Medicare annual wellness visit, subsequent    Need for hepatitis C screening test      Orders Placed This Encounter   Procedures    Hepatitis C Antibody             Follow Up:   Return in about 6 months (around 3/5/2025), or if symptoms worsen or fail to improve.

## 2024-10-07 ENCOUNTER — OFFICE VISIT (OUTPATIENT)
Dept: FAMILY MEDICINE CLINIC | Age: 79
End: 2024-10-07
Payer: MEDICARE

## 2024-10-07 VITALS
HEIGHT: 64 IN | HEART RATE: 67 BPM | BODY MASS INDEX: 23.35 KG/M2 | DIASTOLIC BLOOD PRESSURE: 77 MMHG | WEIGHT: 136.8 LBS | TEMPERATURE: 97.6 F | SYSTOLIC BLOOD PRESSURE: 131 MMHG

## 2024-10-07 DIAGNOSIS — M15.9 GENERALIZED OA: Primary | ICD-10-CM

## 2024-10-07 PROCEDURE — 1160F RVW MEDS BY RX/DR IN RCRD: CPT | Performed by: NURSE PRACTITIONER

## 2024-10-07 PROCEDURE — 20610 DRAIN/INJ JOINT/BURSA W/O US: CPT | Performed by: NURSE PRACTITIONER

## 2024-10-07 PROCEDURE — 1159F MED LIST DOCD IN RCRD: CPT | Performed by: NURSE PRACTITIONER

## 2024-10-07 PROCEDURE — 99213 OFFICE O/P EST LOW 20 MIN: CPT | Performed by: NURSE PRACTITIONER

## 2024-10-07 RX ORDER — TRIAMCINOLONE ACETONIDE 40 MG/ML
80 INJECTION, SUSPENSION INTRA-ARTICULAR; INTRAMUSCULAR
Status: COMPLETED | OUTPATIENT
Start: 2024-10-07 | End: 2024-10-07

## 2024-10-07 RX ADMIN — TRIAMCINOLONE ACETONIDE 80 MG: 40 INJECTION, SUSPENSION INTRA-ARTICULAR; INTRAMUSCULAR at 16:09

## 2024-10-07 NOTE — PROGRESS NOTES
"Chief Complaint  Knee Pain (Left knee injection)    Subjective        Mau Lo presents to [facility] today for left knee pain, taking meloxicam 15 mg daily with some improvement, has had an injection in the past that helped a lot.  Had x-rays done 12/23/21 of both knees.      Current Outpatient Medications:     estradiol (CLIMARA) 0.06 MG/24HR patch, Place 1 patch on the skin as directed by provider 1 (One) Time Per Week., Disp: 12 patch, Rfl: 2    meloxicam (MOBIC) 15 MG tablet, Take 1 tablet by mouth Daily after a meal As Needed for arthritis pain., Disp: 30 tablet, Rfl: 1    Naftin 2 % gel, APPLY A THIN LAYER TO THE FOOT TWICE A DAY WHEN FLARED, Disp: , Rfl:   There are no discontinued medications.      Allergies:  Nitrofurantoin, Penicillins, and Tramadol      Objective   Vital Signs:   Vitals:    10/07/24 1451   BP: 131/77   BP Location: Right arm   Patient Position: Sitting   Cuff Size: Adult   Pulse: 67   Temp: 97.6 °F (36.4 °C)   TempSrc: Oral   Weight: 62.1 kg (136 lb 12.8 oz)   Height: 161.9 cm (63.75\")     Body mass index is 23.67 kg/m².  BMI is within normal parameters. No other follow-up for BMI required.        Physical Exam  Constitutional:       Appearance: Normal appearance.   Neck:      Vascular: No carotid bruit.   Cardiovascular:      Rate and Rhythm: Normal rate and regular rhythm.      Heart sounds: Normal heart sounds.   Pulmonary:      Effort: Pulmonary effort is normal.      Breath sounds: Normal breath sounds.   Musculoskeletal:         General: Normal range of motion.      Comments: Left knee tenderness   Skin:     General: Skin is warm and dry.   Neurological:      General: No focal deficit present.      Mental Status: She is alert.   Psychiatric:         Mood and Affect: Mood normal.         Behavior: Behavior normal.             Lab Results   Component Value Date    GLUCOSE 98 06/10/2020    BUN 15 06/10/2020    CREATININE 0.91 (H) 06/10/2020    " BCR 16 06/10/2020    K 4.1 06/10/2020    CO2 26 06/10/2020    CALCIUM 9.5 06/10/2020    ALBUMIN 4.2 06/10/2020    LABIL2 1.7 06/10/2020    AST 16 06/10/2020    ALT 8 (L) 06/10/2020       Lab Results   Component Value Date    CHLPL 192 06/10/2020    TRIG 56 06/10/2020    HDL 97 (H) 06/10/2020    LDL 84 06/10/2020           Arthrocentesis    Date/Time: 10/7/2024 4:09 PM    Performed by: Hermelinda Alcantar APRN  Authorized by: Hermelinda Alcantar APRN  Indications: pain   Body area: knee  Joint: left knee  Local anesthesia used: yes    Anesthesia:  Local anesthesia used: yes  Local Anesthetic: lidocaine 1% without epinephrine    Sedation:  Patient sedated: no    Preparation: Patient was prepped and draped in the usual sterile fashion.  Needle gauge: 25G.  Ultrasound guidance: no  Approach: medial  Meds administered: 80 mg triamcinolone acetonide 40 MG/ML  Patient tolerance: patient tolerated the procedure well with no immediate complications               Diagnoses and all orders for this visit:    1. Generalized OA (Primary)  -     Arthrocentesis            Follow Up  No follow-ups on file.  Patient was given instructions and counseling regarding her condition or for health maintenance advice. Please see specific information pulled into the AVS if appropriate.           SERA Casas  10/07/2024    Please note that portions of this document were completed using a voice recognition program.

## 2024-11-04 ENCOUNTER — TELEPHONE (OUTPATIENT)
Dept: FAMILY MEDICINE CLINIC | Age: 79
End: 2024-11-04
Payer: MEDICARE

## 2024-11-04 DIAGNOSIS — Z12.31 SCREENING MAMMOGRAM FOR BREAST CANCER: Primary | ICD-10-CM

## 2024-11-04 NOTE — TELEPHONE ENCOUNTER
1st attempt - lmtrc in regards to Hep C lab ordered by pcp on 9/5/24. Pt states she is also due for mammo and is asking for a referral to have done here at [facility]. Last mammo done 10/10/23.

## 2024-11-18 ENCOUNTER — CLINICAL SUPPORT (OUTPATIENT)
Dept: FAMILY MEDICINE CLINIC | Age: 79
End: 2024-11-18
Payer: MEDICARE

## 2024-11-18 ENCOUNTER — LAB (OUTPATIENT)
Dept: LAB | Facility: HOSPITAL | Age: 79
End: 2024-11-18
Payer: MEDICARE

## 2024-11-18 DIAGNOSIS — R63.4 WEIGHT LOSS: ICD-10-CM

## 2024-11-18 DIAGNOSIS — Z13.220 SCREENING, LIPID: ICD-10-CM

## 2024-11-18 DIAGNOSIS — Z23 IMMUNIZATION DUE: Primary | ICD-10-CM

## 2024-11-18 DIAGNOSIS — Z11.59 NEED FOR HEPATITIS C SCREENING TEST: ICD-10-CM

## 2024-11-18 DIAGNOSIS — Z11.59 NEED FOR HEPATITIS C SCREENING TEST: Primary | ICD-10-CM

## 2024-11-18 LAB
ALBUMIN SERPL-MCNC: 4.1 G/DL (ref 3.5–5.2)
ALBUMIN/GLOB SERPL: 1.8 G/DL
ALP SERPL-CCNC: 73 U/L (ref 39–117)
ALT SERPL W P-5'-P-CCNC: 6 U/L (ref 1–33)
ANION GAP SERPL CALCULATED.3IONS-SCNC: 9.7 MMOL/L (ref 5–15)
AST SERPL-CCNC: 21 U/L (ref 1–32)
BASOPHILS # BLD AUTO: 0.02 10*3/MM3 (ref 0–0.2)
BASOPHILS NFR BLD AUTO: 0.3 % (ref 0–1.5)
BILIRUB SERPL-MCNC: 0.5 MG/DL (ref 0–1.2)
BUN SERPL-MCNC: 8 MG/DL (ref 8–23)
BUN/CREAT SERPL: 8 (ref 7–25)
CALCIUM SPEC-SCNC: 9.6 MG/DL (ref 8.6–10.5)
CHLORIDE SERPL-SCNC: 107 MMOL/L (ref 98–107)
CHOLEST SERPL-MCNC: 187 MG/DL (ref 0–200)
CO2 SERPL-SCNC: 26.3 MMOL/L (ref 22–29)
CREAT SERPL-MCNC: 1 MG/DL (ref 0.57–1)
DEPRECATED RDW RBC AUTO: 44.8 FL (ref 37–54)
EGFRCR SERPLBLD CKD-EPI 2021: 58.1 ML/MIN/1.73
EOSINOPHIL # BLD AUTO: 0.05 10*3/MM3 (ref 0–0.4)
EOSINOPHIL NFR BLD AUTO: 0.8 % (ref 0.3–6.2)
ERYTHROCYTE [DISTWIDTH] IN BLOOD BY AUTOMATED COUNT: 13.1 % (ref 12.3–15.4)
GLOBULIN UR ELPH-MCNC: 2.3 GM/DL
GLUCOSE SERPL-MCNC: 96 MG/DL (ref 65–99)
HCT VFR BLD AUTO: 38.4 % (ref 34–46.6)
HCV AB SER QL: NORMAL
HDLC SERPL-MCNC: 79 MG/DL (ref 40–60)
HGB BLD-MCNC: 13.2 G/DL (ref 12–15.9)
IMM GRANULOCYTES # BLD AUTO: 0 10*3/MM3 (ref 0–0.05)
IMM GRANULOCYTES NFR BLD AUTO: 0 % (ref 0–0.5)
LDLC SERPL CALC-MCNC: 94 MG/DL (ref 0–100)
LDLC/HDLC SERPL: 1.17 {RATIO}
LYMPHOCYTES # BLD AUTO: 1.17 10*3/MM3 (ref 0.7–3.1)
LYMPHOCYTES NFR BLD AUTO: 19.6 % (ref 19.6–45.3)
MCH RBC QN AUTO: 32 PG (ref 26.6–33)
MCHC RBC AUTO-ENTMCNC: 34.4 G/DL (ref 31.5–35.7)
MCV RBC AUTO: 93 FL (ref 79–97)
MONOCYTES # BLD AUTO: 0.47 10*3/MM3 (ref 0.1–0.9)
MONOCYTES NFR BLD AUTO: 7.9 % (ref 5–12)
NEUTROPHILS NFR BLD AUTO: 4.25 10*3/MM3 (ref 1.7–7)
NEUTROPHILS NFR BLD AUTO: 71.4 % (ref 42.7–76)
PLATELET # BLD AUTO: 200 10*3/MM3 (ref 140–450)
PMV BLD AUTO: 11 FL (ref 6–12)
POTASSIUM SERPL-SCNC: 4.3 MMOL/L (ref 3.5–5.2)
PROT SERPL-MCNC: 6.4 G/DL (ref 6–8.5)
RBC # BLD AUTO: 4.13 10*6/MM3 (ref 3.77–5.28)
SODIUM SERPL-SCNC: 143 MMOL/L (ref 136–145)
TRIGL SERPL-MCNC: 77 MG/DL (ref 0–150)
TSH SERPL DL<=0.05 MIU/L-ACNC: 2.91 UIU/ML (ref 0.27–4.2)
VLDLC SERPL-MCNC: 14 MG/DL (ref 5–40)
WBC NRBC COR # BLD AUTO: 5.96 10*3/MM3 (ref 3.4–10.8)

## 2024-11-18 PROCEDURE — 84443 ASSAY THYROID STIM HORMONE: CPT

## 2024-11-18 PROCEDURE — 36415 COLL VENOUS BLD VENIPUNCTURE: CPT

## 2024-11-18 PROCEDURE — 86803 HEPATITIS C AB TEST: CPT

## 2024-11-18 PROCEDURE — 90662 IIV NO PRSV INCREASED AG IM: CPT | Performed by: INTERNAL MEDICINE

## 2024-11-18 PROCEDURE — 80061 LIPID PANEL: CPT

## 2024-11-18 PROCEDURE — 85025 COMPLETE CBC W/AUTO DIFF WBC: CPT

## 2024-11-18 PROCEDURE — G0008 ADMIN INFLUENZA VIRUS VAC: HCPCS | Performed by: INTERNAL MEDICINE

## 2024-11-18 PROCEDURE — 80053 COMPREHEN METABOLIC PANEL: CPT

## 2024-11-19 NOTE — PROGRESS NOTES
All of her labs including her cholesterol looks wonderful no changes needed we will repeat these in 6 months to 1 year

## 2025-02-03 ENCOUNTER — HOSPITAL ENCOUNTER (OUTPATIENT)
Dept: MAMMOGRAPHY | Facility: HOSPITAL | Age: 80
Discharge: HOME | End: 2025-02-03
Admitting: NURSE PRACTITIONER
Payer: MEDICARE

## 2025-02-03 DIAGNOSIS — Z12.31 SCREENING MAMMOGRAM FOR BREAST CANCER: ICD-10-CM

## 2025-02-03 PROCEDURE — 77063 BREAST TOMOSYNTHESIS BI: CPT

## 2025-02-03 PROCEDURE — 77067 SCR MAMMO BI INCL CAD: CPT

## 2025-04-10 DIAGNOSIS — Z78.0 POSTMENOPAUSAL STATE: ICD-10-CM

## 2025-04-10 RX ORDER — ESTRADIOL 0.06 MG/D
1 PATCH TRANSDERMAL WEEKLY
Qty: 12 PATCH | Refills: 2 | Status: SHIPPED | OUTPATIENT
Start: 2025-04-10